# Patient Record
Sex: FEMALE | Race: BLACK OR AFRICAN AMERICAN | NOT HISPANIC OR LATINO
[De-identification: names, ages, dates, MRNs, and addresses within clinical notes are randomized per-mention and may not be internally consistent; named-entity substitution may affect disease eponyms.]

---

## 2017-06-14 ENCOUNTER — HOSPITAL (OUTPATIENT)
Dept: OTHER | Age: 27
End: 2017-06-14
Attending: EMERGENCY MEDICINE

## 2019-10-02 ENCOUNTER — HOSPITAL ENCOUNTER (EMERGENCY)
Facility: HOSPITAL | Age: 29
Discharge: HOME | End: 2019-10-02
Attending: EMERGENCY MEDICINE
Payer: MEDICAID

## 2019-10-02 ENCOUNTER — APPOINTMENT (OUTPATIENT)
Dept: ULTRASOUND IMAGING | Facility: HOSPITAL | Age: 29
End: 2019-10-02
Attending: EMERGENCY MEDICINE
Payer: MEDICAID

## 2019-10-02 VITALS
TEMPERATURE: 98 F | RESPIRATION RATE: 18 BRPM | HEART RATE: 76 BPM | HEIGHT: 63 IN | OXYGEN SATURATION: 99 % | BODY MASS INDEX: 27.64 KG/M2 | DIASTOLIC BLOOD PRESSURE: 74 MMHG | WEIGHT: 156 LBS | SYSTOLIC BLOOD PRESSURE: 110 MMHG

## 2019-10-02 DIAGNOSIS — R10.9 ABDOMINAL PAIN DURING PREGNANCY IN FIRST TRIMESTER: Primary | ICD-10-CM

## 2019-10-02 DIAGNOSIS — O26.891 ABDOMINAL PAIN DURING PREGNANCY IN FIRST TRIMESTER: Primary | ICD-10-CM

## 2019-10-02 PROCEDURE — 85025 COMPLETE CBC W/AUTO DIFF WBC: CPT | Performed by: EMERGENCY MEDICINE

## 2019-10-02 PROCEDURE — 99284 EMERGENCY DEPT VISIT MOD MDM: CPT

## 2019-10-02 PROCEDURE — 36415 COLL VENOUS BLD VENIPUNCTURE: CPT

## 2019-10-02 PROCEDURE — 87106 FUNGI IDENTIFICATION YEAST: CPT | Performed by: EMERGENCY MEDICINE

## 2019-10-02 PROCEDURE — 80048 BASIC METABOLIC PNL TOTAL CA: CPT | Performed by: EMERGENCY MEDICINE

## 2019-10-02 PROCEDURE — 76801 OB US < 14 WKS SINGLE FETUS: CPT | Performed by: EMERGENCY MEDICINE

## 2019-10-02 PROCEDURE — 81003 URINALYSIS AUTO W/O SCOPE: CPT | Performed by: EMERGENCY MEDICINE

## 2019-10-02 PROCEDURE — 87205 SMEAR GRAM STAIN: CPT | Performed by: EMERGENCY MEDICINE

## 2019-10-02 PROCEDURE — 87591 N.GONORRHOEAE DNA AMP PROB: CPT | Performed by: EMERGENCY MEDICINE

## 2019-10-02 PROCEDURE — 87808 TRICHOMONAS ASSAY W/OPTIC: CPT | Performed by: EMERGENCY MEDICINE

## 2019-10-02 PROCEDURE — 86901 BLOOD TYPING SEROLOGIC RH(D): CPT | Performed by: EMERGENCY MEDICINE

## 2019-10-02 PROCEDURE — 87491 CHLMYD TRACH DNA AMP PROBE: CPT | Performed by: EMERGENCY MEDICINE

## 2019-10-02 PROCEDURE — 84702 CHORIONIC GONADOTROPIN TEST: CPT | Performed by: EMERGENCY MEDICINE

## 2019-10-02 PROCEDURE — 86900 BLOOD TYPING SEROLOGIC ABO: CPT | Performed by: EMERGENCY MEDICINE

## 2019-10-02 PROCEDURE — 81025 URINE PREGNANCY TEST: CPT

## 2019-10-02 NOTE — ED INITIAL ASSESSMENT (HPI)
Pt present with lowe abd cramping for the past 2 weeks, vaginal discharge and urinary frequency. Pt states she's been recently treated for STD but feels like symptoms are not yet resolved. Pt is  about 10wks gestation.

## 2019-10-02 NOTE — ED PROVIDER NOTES
Patient Seen in: [facility] AND [facility] Emergency Department      History   Patient presents with:  Abdomen/Flank Pain (GI/)    Stated Complaint: abd cramping    HPI    79-year-old G 11 P2 at approximately 10 weeks of pregnancy resenting for evaluation of l no distension. There is tenderness in the suprapubic area. Genitourinary: Vagina normal. Cervix exhibits no motion tenderness, no discharge and no friability. Right adnexum displays no tenderness. Left adnexum displays no tenderness.    Musculoskeletal: N ultrasound could be considered in 1 year to confirm resolution. 4. Unremarkable Doppler assessment of the left ovary.       Dictated by (CST): Nisha Zaldivar MD on 10/02/2019 at 11:31     Approved by (CST): Nisha Zaldivar MD on 10/02/2019 at 11:41

## 2024-03-11 ENCOUNTER — HOSPITAL ENCOUNTER (EMERGENCY)
Facility: HOSPITAL | Age: 34
Discharge: HOME | End: 2024-03-11
Attending: EMERGENCY MEDICINE
Payer: MEDICAID

## 2024-03-11 ENCOUNTER — TELEPHONE (OUTPATIENT)
Dept: INTERNAL MEDICINE CLINIC | Facility: CLINIC | Age: 34
End: 2024-03-11

## 2024-03-11 VITALS
DIASTOLIC BLOOD PRESSURE: 73 MMHG | OXYGEN SATURATION: 99 % | SYSTOLIC BLOOD PRESSURE: 112 MMHG | BODY MASS INDEX: 32 KG/M2 | HEART RATE: 78 BPM | TEMPERATURE: 98 F | WEIGHT: 178 LBS | RESPIRATION RATE: 16 BRPM

## 2024-03-11 DIAGNOSIS — H10.13 ALLERGIC CONJUNCTIVITIS OF BOTH EYES: Primary | ICD-10-CM

## 2024-03-11 PROCEDURE — 99283 EMERGENCY DEPT VISIT LOW MDM: CPT

## 2024-03-11 RX ORDER — AZELASTINE HYDROCHLORIDE 0.5 MG/ML
1 SOLUTION/ DROPS OPHTHALMIC 2 TIMES DAILY PRN
Qty: 6 ML | Refills: 0 | Status: SHIPPED | OUTPATIENT
Start: 2024-03-11

## 2024-03-11 NOTE — ED PROVIDER NOTES
Patient Seen in: [facility] Emergency Department    History     Chief Complaint   Patient presents with    Eye Problem       HPI    33-year-old female here with 1 year of intermittent pruritic sensation of bilateral eyes.  She denies any discharge or vision changes or diplopia.    History reviewed. History reviewed. No pertinent past medical history.    History reviewed. History reviewed. No pertinent surgical history.      Medications :  (Not in a hospital admission)       No family history on file.    Smoking Status:   Social History     Socioeconomic History    Marital status: Single   Tobacco Use    Smoking status: Every Day    Smokeless tobacco: Never       Constitutional and vital signs reviewed.      Social History and Family History elements reviewed from today, pertinent positives to the presenting problem noted.    Physical Exam     ED Triage Vitals [03/11/24 0811]   /81   Pulse 73   Resp 18   Temp 98 °F (36.7 °C)   Temp src    SpO2 98 %   O2 Device        All measures to prevent infection transmission during my interaction with the patient were taken. The patient was already wearing a droplet mask on my arrival to the room. Personal protective equipment was worn throughout the duration of the exam.  Handwashing was performed prior to and after the exam.  Stethoscope and any equipment used during my examination was cleaned with super sani-cloth germicidal wipes following the exam.     Physical Exam    General: NAD  Head: Normocephalic and atraumatic.  Mouth/Throat/Ears/Nose: No hoarseness of voice  Eyes: Conjunctivae and EOM are normal. VA grossly intact.   Neck: Normal range of motion. Supple.   Cardiovascular: Normal rate  Respiratory/Chest: No tachypnea.  Gastrointestinal: Nondistended  Musculoskeletal:No swelling or deformity.   Neurological: Alert and appropriate.   Skin: Skin is warm and dry. No pallor.  Psychiatric: Has a normal mood and affect.      ED Course      Labs Reviewed - No  data to display    As Interpreted by me    Imaging Results Available and Reviewed while in ED: No results found.  ED Medications Administered: Medications - No data to display      MDM     Vitals:    03/11/24 0811   BP: 137/81   Pulse: 73   Resp: 18   Temp: 98 °F (36.7 °C)   SpO2: 98%   Weight: 80.7 kg     *I personally reviewed and interpreted all ED vitals.    Pulse Ox: 98%, Room air, Normal       Medical Decision Making      Differential Diagnosis/ Diagnostic Considerations: Allergic conjunctivitis    Complicating Factors: The patient already has does not have a problem list on file. to contribute to the complexity of this ED evaluation.    I reviewed prior chart records including office visit note from November 13, 2023.  Patient is here with chronic allergic conjunctivitis.  Very low suspicion for bacterial etiology.  Given chronicity of symptoms, I recommended patient see optometry in the outpatient setting.    Discharged in stable condition.  Patient is comfortable with the plan.    Prescriptions: Azelastine      Disposition and Plan     Clinical Impression:  1. Allergic conjunctivitis of both eyes        Disposition:  Discharge    Follow-up:  Caro Loyd MD  [address] [place] [id number]  [phone]    Schedule an appointment as soon as possible for a visit in 1 day(s)        Medications Prescribed:  Current Discharge Medication List        START taking these medications    Details   Azelastine HCl 0.05 % Ophthalmic Solution Place 1 drop into both eyes 2 (two) times daily as needed.  Qty: 6 mL, Refills: 0

## 2024-03-11 NOTE — TELEPHONE ENCOUNTER
Have never seen the patient.  Patient is new to our clinic.  She needs follow-up with a provider.  I am currently not taking any new patients.  She can see any of us.

## (undated) NOTE — ED AVS SNAPSHOT
Austin Cabrera   MRN: [id number]    Department:  [facility] Emergency Department   Date of Visit:  10/2/2019           Disclosure     Insurance plans vary and the physician(s) referred by the ER may not be covered by your plan.  Please contact CARE PHYSICIAN AT ONCE OR RETURN IMMEDIATELY TO THE EMERGENCY DEPARTMENT. If you have been prescribed any medication(s), please fill your prescription right away and begin taking the medication(s) as directed.   If you believe that any of the medications